# Patient Record
Sex: MALE | URBAN - METROPOLITAN AREA
[De-identification: names, ages, dates, MRNs, and addresses within clinical notes are randomized per-mention and may not be internally consistent; named-entity substitution may affect disease eponyms.]

---

## 2024-07-09 ENCOUNTER — TELEPHONE (OUTPATIENT)
Age: 39
End: 2024-07-09

## 2024-07-09 NOTE — TELEPHONE ENCOUNTER
Left voicemail to callback pt was scheduled to see FAY Edmond and was only called once,please inform pt that FAY Edmond is on Maternity leave.And that he can re-schedule his appt with FAY Edmond when she get's back or pick another provider.

## 2024-11-08 ENCOUNTER — TELEPHONE (OUTPATIENT)
Age: 39
End: 2024-11-08

## 2024-11-08 NOTE — TELEPHONE ENCOUNTER
----- Message from Rosi PIERRE sent at 11/8/2024 11:16 AM EST -----  Regarding: FW: [facility] Appointment Request    ----- Message -----  From: Caleb Moser  Sent: 11/7/2024  12:40 PM EST  To: [facility] Clinical Staff  Subject: [facility] Appointment Request                          [facility] Appointment Request    Patient needs appointment for [facility] Appointment Type: New Patient.    Patient Requested Dates(s): As soon as possible.  Patient Requested Time: As soon as possible.  Provider Name: Dr. Luis Hart     Reason for Appointment Request: New Patient - Requested Provider unavailable. Patient needs a prescription refill as soon as possible for his inhaler.   --------------------------------------------------------------------------------------------------------------------------    Relationship to Patient: Self     Call Back Information: OK to leave message on voicemail  Preferred Call Back Number: Phone - [phone]